# Patient Record
Sex: MALE | Race: WHITE | NOT HISPANIC OR LATINO
[De-identification: names, ages, dates, MRNs, and addresses within clinical notes are randomized per-mention and may not be internally consistent; named-entity substitution may affect disease eponyms.]

---
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
## 2021-02-02 NOTE — HISTORY OF PRESENT ILLNESS
Sandro Mcgrath, can you take care of this? [Medical Office: ([facility])___] : at the medical office located in  [Home] : at home, [unfilled] , at the time of the visit. [Patient] : the patient [de-identified] : L shoulder [de-identified] : 20yo M s/p Left shoulder arthroscopy with Bankart labral repair, 1/23/20. HE is doing well.  has not been doing PT because of covid pandemic. was doing hep but resistance band broke.  has returned to gym.   [de-identified] : left shoulder exam.\par inc healed well. no erythema\par active fe 160, er 60\par no apprehension\par able to flex/ext all fingers\par silt r/u/m/ax\par bcr\par  [de-identified] : 18yo M s/p Left shoulder arthroscopy with Bankart labral repair, 1/23/20. [de-identified] : I he is now 3 months status post Bankart labral repair doing well he has full range of motion reports no pain and has no subjective instability he will slowly graduate his exercises beginning with light weights at the gym we discussed the importance of avoiding abduction external rotation which he expressed understanding he will follow up in 6 weeks for further evaluation and sports participation evaluation all questions answered

## 2021-02-10 ENCOUNTER — TRANSCRIPTION ENCOUNTER (OUTPATIENT)
Age: 21
End: 2021-02-10

## 2024-04-10 ENCOUNTER — APPOINTMENT (OUTPATIENT)
Dept: ORTHOPEDIC SURGERY | Facility: CLINIC | Age: 24
End: 2024-04-10
Payer: COMMERCIAL

## 2024-04-10 ENCOUNTER — NON-APPOINTMENT (OUTPATIENT)
Age: 24
End: 2024-04-10

## 2024-04-10 VITALS — WEIGHT: 170 LBS | HEIGHT: 66 IN | BODY MASS INDEX: 27.32 KG/M2

## 2024-04-10 PROCEDURE — 99203 OFFICE O/P NEW LOW 30 MIN: CPT

## 2024-04-10 PROCEDURE — 73562 X-RAY EXAM OF KNEE 3: CPT | Mod: RT

## 2024-04-10 NOTE — HISTORY OF PRESENT ILLNESS
[de-identified] : 24yo male presents complaining of right knee pain for greater than 1 year.  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  No specific injuries.  Pain is anterior aspect of the knee.  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  He states stairs, kneeling, bending his knee makes this worse.  He has been doing home PT exercises and stretches over the last 6 weeks without much relief.  He feels like symptoms are overall worsening.  He is here today for further knee consultation.  Denies numbness tingling  The patient's past medical history, past surgical history, medications, allergies, and social history were reviewed by me today with the patient and documented accordingly. In addition, the patient's family history, which is noncontributory to this visit, was also reviewed.

## 2024-04-10 NOTE — PHYSICAL EXAM
[de-identified] : General Exam  Well developed, well nourished  No apparent distress  Oriented to person, place, and time  Mood: Normal  Affect: Normal  Balance and coordination: Normal  Gait: Normal  right knee exam    Skin: Clean, dry, intact Inspection: No obvious malalignment, no masses, no swelling, no effusion.  Tenderness: no MJLT. No LJLT. + tenderness over the medial and lateral patella facets. No ttp medial/lateral epicondyle, patella tendon, tibial tubercle, pes anserinus, or proximal fibula.  ROM: 0 to 140 no pain with deep flexion  Stability: Stable to varus, valgus, lachman testing. Negative anterior/posterior drawer.  Additional tests: Negative McMurrays test, +patellar grind test.   Strength: 5/5 Q/H/TA/GS/EHL, no atrophy  Neuro: In tact to light touch throughout in dp/sp/tib/isaac/saph nerve districutions,  DTR's normal Pulses: 2+ DP/PT pulses. [de-identified] : The following radiographs were ordered and read by me during this patients visit. I reviewed each radiograph in detail with the patient and discussed the findings as highlighted below.  3 views right knee obtained today well-maintained joint spaces normal alignment no fracture

## 2024-04-19 ENCOUNTER — APPOINTMENT (OUTPATIENT)
Dept: MRI IMAGING | Facility: CLINIC | Age: 24
End: 2024-04-19
Payer: COMMERCIAL

## 2024-04-19 ENCOUNTER — OUTPATIENT (OUTPATIENT)
Dept: OUTPATIENT SERVICES | Facility: HOSPITAL | Age: 24
LOS: 1 days | End: 2024-04-19
Payer: COMMERCIAL

## 2024-04-19 DIAGNOSIS — Z00.8 ENCOUNTER FOR OTHER GENERAL EXAMINATION: ICD-10-CM

## 2024-04-19 PROCEDURE — 73721 MRI JNT OF LWR EXTRE W/O DYE: CPT

## 2024-04-19 PROCEDURE — 73721 MRI JNT OF LWR EXTRE W/O DYE: CPT | Mod: 26,RT

## 2024-04-21 ENCOUNTER — NON-APPOINTMENT (OUTPATIENT)
Age: 24
End: 2024-04-21

## 2024-05-14 ENCOUNTER — APPOINTMENT (OUTPATIENT)
Dept: ORTHOPEDIC SURGERY | Facility: CLINIC | Age: 24
End: 2024-05-14
Payer: COMMERCIAL

## 2024-05-14 VITALS — WEIGHT: 170 LBS | BODY MASS INDEX: 27.32 KG/M2 | HEIGHT: 66 IN

## 2024-05-14 DIAGNOSIS — M76.51 PATELLAR TENDINITIS, RIGHT KNEE: ICD-10-CM

## 2024-05-14 DIAGNOSIS — G89.29 PAIN IN RIGHT KNEE: ICD-10-CM

## 2024-05-14 DIAGNOSIS — M25.561 PAIN IN RIGHT KNEE: ICD-10-CM

## 2024-05-14 PROCEDURE — 99213 OFFICE O/P EST LOW 20 MIN: CPT

## 2024-05-14 NOTE — DISCUSSION/SUMMARY
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  We discussed treatment options at length we discussed nonoperative care with activity modification physical therapy anti-inflammatory medications.  We discussed surgical treatment with patella tendon debridement and repair.  He is interested in starting a course of physical therapy at this time we will follow-up in 2 months if not improved we will again discuss patella tendon debridement and repair he was agreed with this plan.  All questions were answered

## 2024-05-14 NOTE — HISTORY OF PRESENT ILLNESS
[de-identified] : 22yo male presents complaining of right knee pain for greater than 1 year.  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  No specific injuries.  Pain is anterior aspect of the knee.  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  He states stairs, kneeling, bending his knee makes this worse.  He has been doing home PT exercises and stretches over the last 6 weeks without much relief.  He feels like symptoms are overall worsening.  He is here today for further knee consultation.  Denies numbness tingling A MRI was done since last visit, here to review results today.

## 2024-05-14 NOTE — PHYSICAL EXAM
[de-identified] : right knee exam    Skin: Clean, dry, intact Inspection: No obvious malalignment, no masses, no swelling, no effusion.  Tenderness: no MJLT. No LJLT. + tenderness over the medial and lateral patella facets. No ttp medial/lateral epicondyle, patella tendon, tibial tubercle, pes anserinus, or proximal fibula.  ROM: 0 to 140 no pain with deep flexion  Stability: Stable to varus, valgus, lachman testing. Negative anterior/posterior drawer.  Additional tests: Negative McMurrays test, +patellar grind test.   Strength: 5/5 Q/H/TA/GS/EHL, no atrophy  Neuro: In tact to light touch throughout in dp/sp/tib/isaac/saph nerve districutions,  DTR's normal Pulses: 2+ DP/PT pulses. [de-identified] : MRI reviewed right knee there is moderate proximal patella tendinopathy with interstitial partial tearing